# Patient Record
Sex: FEMALE | Race: OTHER | HISPANIC OR LATINO | URBAN - METROPOLITAN AREA
[De-identification: names, ages, dates, MRNs, and addresses within clinical notes are randomized per-mention and may not be internally consistent; named-entity substitution may affect disease eponyms.]

---

## 2020-10-07 ENCOUNTER — TELEPHONE ENCOUNTER (OUTPATIENT)
Dept: URBAN - METROPOLITAN AREA CLINIC 54 | Facility: CLINIC | Age: 30
End: 2020-10-07

## 2020-10-07 RX ORDER — ESOMEPRAZOLE MAGNESIUM 40 MG/1
1 CAPSULE CAPSULE, DELAYED RELEASE ORAL ONCE A DAY
Qty: 30 | OUTPATIENT
Start: 2020-10-07

## 2020-10-08 ENCOUNTER — ERX REFILL RESPONSE (OUTPATIENT)
Age: 30
End: 2020-10-08

## 2020-10-08 RX ORDER — ESOMEPRAZOLE MAGNESIUM 40 MG/1
TAKE 1 CAPSULE BY MOUTH TWICE A DAY 30 MINUTES BEFORE A MEAL CAPSULE, DELAYED RELEASE PELLETS ORAL
Qty: 60 | Refills: 0

## 2022-03-23 ENCOUNTER — OFFICE VISIT (OUTPATIENT)
Dept: URBAN - NONMETROPOLITAN AREA CLINIC 4 | Facility: CLINIC | Age: 32
End: 2022-03-23

## 2022-08-24 ENCOUNTER — OFFICE VISIT (OUTPATIENT)
Dept: URBAN - NONMETROPOLITAN AREA CLINIC 4 | Facility: CLINIC | Age: 32
End: 2022-08-24

## 2022-08-24 RX ORDER — ESOMEPRAZOLE MAGNESIUM 40 MG/1
TAKE 1 CAPSULE BY MOUTH TWICE A DAY 30 MINUTES BEFORE A MEAL CAPSULE, DELAYED RELEASE PELLETS ORAL
Qty: 60 | Refills: 0 | Status: ACTIVE | COMMUNITY

## 2024-02-15 ENCOUNTER — LAB (OUTPATIENT)
Dept: URBAN - NONMETROPOLITAN AREA CLINIC 4 | Facility: CLINIC | Age: 34
End: 2024-02-15

## 2024-02-15 ENCOUNTER — OV NP (OUTPATIENT)
Dept: URBAN - NONMETROPOLITAN AREA CLINIC 4 | Facility: CLINIC | Age: 34
End: 2024-02-15
Payer: COMMERCIAL

## 2024-02-15 VITALS
SYSTOLIC BLOOD PRESSURE: 111 MMHG | WEIGHT: 137.8 LBS | BODY MASS INDEX: 22.96 KG/M2 | HEIGHT: 65 IN | DIASTOLIC BLOOD PRESSURE: 73 MMHG | HEART RATE: 69 BPM | TEMPERATURE: 97.9 F

## 2024-02-15 DIAGNOSIS — R09.A2 GLOBUS SENSATION: ICD-10-CM

## 2024-02-15 DIAGNOSIS — K59.09 CHRONIC CONSTIPATION: ICD-10-CM

## 2024-02-15 DIAGNOSIS — K21.9 GASTROESOPHAGEAL REFLUX DISEASE, UNSPECIFIED WHETHER ESOPHAGITIS PRESENT: ICD-10-CM

## 2024-02-15 DIAGNOSIS — R10.30 LOWER ABDOMINAL PAIN: ICD-10-CM

## 2024-02-15 DIAGNOSIS — R14.0 ABDOMINAL BLOATING: ICD-10-CM

## 2024-02-15 DIAGNOSIS — R10.13 EPIGASTRIC PAIN: ICD-10-CM

## 2024-02-15 DIAGNOSIS — Z86.19 HISTORY OF HELICOBACTER PYLORI INFECTION: ICD-10-CM

## 2024-02-15 PROBLEM — 440630006: Status: ACTIVE | Noted: 2024-02-15

## 2024-02-15 PROBLEM — 235595009: Status: ACTIVE | Noted: 2024-02-15

## 2024-02-15 PROCEDURE — 99204 OFFICE O/P NEW MOD 45 MIN: CPT | Performed by: REGISTERED NURSE

## 2024-02-15 RX ORDER — PANTOPRAZOLE SODIUM 40 MG/1
1 TABLET TABLET, DELAYED RELEASE ORAL ONCE A DAY
Qty: 30 | Refills: 1 | OUTPATIENT
Start: 2024-02-15

## 2024-02-15 RX ORDER — ESOMEPRAZOLE MAGNESIUM 40 MG/1
TAKE 1 CAPSULE BY MOUTH TWICE A DAY 30 MINUTES BEFORE A MEAL CAPSULE, DELAYED RELEASE PELLETS ORAL
Qty: 60 | Refills: 0 | Status: ON HOLD | COMMUNITY

## 2024-02-15 NOTE — HPI-TODAY'S VISIT:
2/15/24: Pt is a 34 yo female with PMH of H. pylori infection, GERD, chronic constipation who presents for evaluation of acid reflux and epigastric pain.  Pt reports longstanidng history of GERD with assocaited globus sensation for past 4 weeks. States she wakes up at night with symptoms. She also has mucous and throat clearing in the morning as well as occasional epigastric pain that is stabbing in nature. Denies N/V, dysphagia, melena, hematochezia, weight loss. Denies chronic NSAID use. Denies alcohol or tobacco use. Reports a history of H pylori 7-9 yrs ago while living in [place] and completed 2 rounds of treatment. Last EGD 3 yrs ago in [place] was reportedly normal. She is not currently taking acid suppressive medication. Has tried OTC Prilosec and Nexium in the past. She also has chronic constipation. Will go 3-4 days without a BM. Admits to straining and hard stools. Has incomplete evacuation. Has associated bloating, abdomnial cramping and gas. Drinks prune juice and laxative tea. Has tried Miralax and fiber supplement with no improvement. No FHx of CRC.

## 2024-02-22 LAB
(TTG) AB, IGA: <1
(TTG) AB, IGG: <1
A/G RATIO: 1.6
ALBUMIN: 4.4
ALKALINE PHOSPHATASE: 61
ALT (SGPT): 13
ANTIGLIADIN ABS, IGA: <1
AST (SGOT): 16
BILIRUBIN, TOTAL: 0.3
BUN/CREATININE RATIO: (no result)
BUN: 12
C-REACTIVE PROTEIN, QUANT: 3.3
CALCIUM: 8.8
CARBON DIOXIDE, TOTAL: 21
CHLORIDE: 107
CREATININE: 0.57
EGFR: 124
GLIADIN (DEAMIDATED) AB (IGG): <1
GLOBULIN, TOTAL: 2.8
GLUCOSE: 93
H PYLORI BREATH TEST: NOT DETECTED
HEMATOCRIT: 39.5
HEMOGLOBIN: 13.4
IMMUNOGLOBULIN A: 193
LIPASE: 17
MCH: 30.7
MCHC: 33.9
MCV: 90.4
MPV: 12.2
PLATELET COUNT: 268
POTASSIUM: 4.3
PROTEIN, TOTAL: 7.2
RDW: 14.8
RED BLOOD CELL COUNT: 4.37
SODIUM: 138
TSH: 1.06
WHITE BLOOD CELL COUNT: 5.2

## 2024-03-21 ENCOUNTER — OV EP (OUTPATIENT)
Dept: URBAN - NONMETROPOLITAN AREA CLINIC 4 | Facility: CLINIC | Age: 34
End: 2024-03-21

## 2024-03-21 RX ORDER — ESOMEPRAZOLE MAGNESIUM 40 MG/1
TAKE 1 CAPSULE BY MOUTH TWICE A DAY 30 MINUTES BEFORE A MEAL CAPSULE, DELAYED RELEASE PELLETS ORAL
Qty: 60 | Refills: 0 | COMMUNITY

## 2024-03-21 RX ORDER — PANTOPRAZOLE SODIUM 40 MG/1
TAKE 1 TABLET BY MOUTH EVERY DAY FOR 30 DAYS TABLET, DELAYED RELEASE ORAL
Qty: 90 TABLET | Refills: 1 | COMMUNITY